# Patient Record
Sex: MALE | URBAN - METROPOLITAN AREA
[De-identification: names, ages, dates, MRNs, and addresses within clinical notes are randomized per-mention and may not be internally consistent; named-entity substitution may affect disease eponyms.]

---
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
## 2024-02-27 ENCOUNTER — ATHLETIC TRAINING (OUTPATIENT)
Dept: SPORTS MEDICINE | Facility: OTHER | Age: 19
End: 2024-02-27

## 2024-02-27 DIAGNOSIS — S76.112A QUADRICEPS STRAIN, LEFT, INITIAL ENCOUNTER: Primary | ICD-10-CM
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
## 2024-02-28 ENCOUNTER — ATHLETIC TRAINING (OUTPATIENT)
Dept: SPORTS MEDICINE | Facility: OTHER | Age: 19
End: 2024-02-28

## 2024-02-28 DIAGNOSIS — S76.112A QUADRICEPS STRAIN, LEFT, INITIAL ENCOUNTER: Primary | ICD-10-CM
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
